# Patient Record
Sex: MALE | Race: WHITE | NOT HISPANIC OR LATINO | Employment: FULL TIME | URBAN - METROPOLITAN AREA
[De-identification: names, ages, dates, MRNs, and addresses within clinical notes are randomized per-mention and may not be internally consistent; named-entity substitution may affect disease eponyms.]

---

## 2018-08-20 ENCOUNTER — OFFICE VISIT (OUTPATIENT)
Dept: FAMILY MEDICINE | Facility: CLINIC | Age: 33
End: 2018-08-20
Payer: COMMERCIAL

## 2018-08-20 VITALS
HEIGHT: 70 IN | RESPIRATION RATE: 16 BRPM | OXYGEN SATURATION: 97 % | DIASTOLIC BLOOD PRESSURE: 68 MMHG | HEART RATE: 76 BPM | TEMPERATURE: 98.3 F | WEIGHT: 141.6 LBS | SYSTOLIC BLOOD PRESSURE: 128 MMHG | BODY MASS INDEX: 20.27 KG/M2

## 2018-08-20 DIAGNOSIS — H65.02 ACUTE SEROUS OTITIS MEDIA OF LEFT EAR, RECURRENCE NOT SPECIFIED: ICD-10-CM

## 2018-08-20 DIAGNOSIS — H61.23 BILATERAL IMPACTED CERUMEN: Primary | ICD-10-CM

## 2018-08-20 PROCEDURE — 69209 REMOVE IMPACTED EAR WAX UNI: CPT | Mod: 50 | Performed by: FAMILY MEDICINE

## 2018-08-20 PROCEDURE — 99202 OFFICE O/P NEW SF 15 MIN: CPT | Mod: 25 | Performed by: FAMILY MEDICINE

## 2018-08-20 RX ORDER — AZITHROMYCIN 250 MG/1
TABLET, FILM COATED ORAL
Qty: 6 TABLET | Refills: 0 | Status: SHIPPED | OUTPATIENT
Start: 2018-08-20

## 2018-08-20 NOTE — PROGRESS NOTES
"  SUBJECTIVE:   Matthew Guerrero is a 32 year old male who presents to clinic today for the following health issues:      Concern - Ear pain  Onset: x 1 month after having vertigo     Description:   Ringing in left ear, both ears will sometimes get pressure behind them    Intensity: 4-5/10    Progression of Symptoms:  same    Accompanying Signs & Symptoms:  Had Vertigo about a month ago    Previous history of similar problem:   Yes with past vertigo about a year ago    Precipitating factors:   Worsened by: nothing    Alleviating factors:  Improved by: nothing    Therapies Tried and outcome: Nothing has been tried        Problem list and histories reviewed & adjusted, as indicated.  Additional history: as documented        Reviewed and updated as needed this visit by clinical staff       Reviewed and updated as needed this visit by Provider        SUBJECTIVE:  Matthew  is a 32 year old male who presents for: Left ear pain and decreased hearing in both ears.  He has had episodes of vertigo.  [place] trouble.  He has had trouble with his ears after swimming.    History reviewed. No pertinent past medical history.  Past Surgical History:   Procedure Laterality Date     HC CLOSED TX DIST RAD/ULNAR STYL FX W/O MANIP  1997     Social History   Substance Use Topics     Smoking status: Current Every Day Smoker     Packs/day: 0.50     Types: Cigarettes     Smokeless tobacco: Never Used     Alcohol use Yes      Comment: occasionally     Current Outpatient Prescriptions   Medication Sig Dispense Refill     azithromycin (ZITHROMAX) 250 MG tablet Two tablets first day, then one tablet daily for four days. 6 tablet 0       REVIEW OF SYSTEMS:   5 point ROS negative except as noted above in HPI, including Gen., Resp, CV, GI &  system review.     OBJECTIVE:  Vitals: /68  Pulse 76  Temp 98.3  F (36.8  C) (Temporal)  Resp 16  Ht 5' 10.08\" (1.78 m)  Wt 141 lb 9.6 oz (64.2 kg)  SpO2 " 97%  BMI 20.27 kg/m2  BMI= Body mass index is 20.27 kg/(m^2).  He is alert appears in no distress.  Both canals are completely occluded with cerumen and were successfully irrigated out with elephant wash system.  Left TM looked red.  Right was okay.  Throat clear.  Neck supple no adenopathy.    ASSESSMENT:  Left otitis media  #2 bilateral cerumen impaction cleared    PLAN:  We will put him on a Z-Ady hopefully that will clear the infection on the left.  Follow-up if not improving we recheck that ear again.  Also recommended some Mucinex.  His hearing was much better when he left.        Tanner Ugarte MD  [facility]

## 2018-08-20 NOTE — MR AVS SNAPSHOT
"              After Visit Summary   2018    Matthew Guerrero    MRN: [id number]           Patient Information     Date Of Birth          1985        Visit Information        Provider Department      2018 1:40 PM Tanner Ugarte MD [facility]        Today's Diagnoses     Bilateral impacted cerumen    -  1    Acute serous otitis media of left ear, recurrence not specified           Follow-ups after your visit        Who to contact     If you have questions or need follow up information about today's clinic visit or your schedule please contact [facility] directly at [phone].  Normal or non-critical lab and imaging results will be communicated to you by Baydinhart, letter or phone within 4 business days after the clinic has received the results. If you do not hear from us within 7 days, please contact the clinic through Baydinhart or phone. If you have a critical or abnormal lab result, we will notify you by phone as soon as possible.  Submit refill requests through I.Systems or call your pharmacy and they will forward the refill request to us. Please allow 3 business days for your refill to be completed.          Additional Information About Your Visit        MyChart Information     [employer]Systems lets you send messages to your doctor, view your test results, renew your prescriptions, schedule appointments and more. To sign up, go to [email or web address] . Click on \"Log in\" on the left side of the screen, which will take you to the Welcome page. Then click on \"Sign up Now\" on the right side of the page.     You will be asked to enter the access code listed below, as well as some personal information. Please follow the directions to create your username and password.     Your access code is: [email or web address]  Expires: 2018  3:28 PM     Your access code will  in 90 days. If you need help or a new code, please call your [facility] or [phone].        Care " "EveryWhere ID     This is your Care EveryWhere ID. This could be used by other organizations to access your [place] medical records  [id number]        Your Vitals Were     Pulse Temperature Respirations Height Pulse Oximetry BMI (Body Mass Index)    76 98.3  F (36.8  C) (Temporal) 16 5' 10.08\" (1.78 m) 97% 20.27 kg/m2       Blood Pressure from Last 3 Encounters:   08/20/18 128/68   06/13/12 142/74   04/20/06 124/66    Weight from Last 3 Encounters:   08/20/18 141 lb 9.6 oz (64.2 kg)   06/13/12 151 lb 2 oz (68.5 kg)   04/20/06 155 lb (70.3 kg) (52 %)*     * Growth percentiles are based on [facility] 2-20 Years data.              We Performed the Following     HC REMOVAL IMPACTED CERUMEN IRRIGATION/LVG UNILAT          Today's Medication Changes          These changes are accurate as of 8/20/18  3:28 PM.  If you have any questions, ask your nurse or doctor.               Start taking these medicines.        Dose/Directions    azithromycin 250 MG tablet   Commonly known as:  ZITHROMAX   Used for:  Acute serous otitis media of left ear, recurrence not specified   Started by:  Tanner Ugarte MD        Two tablets first day, then one tablet daily for four days.   Quantity:  6 tablet   Refills:  0            Where to get your medicines      These medications were sent to [facility] [address]  [address] [facility] [address]     Phone:  [phone]     azithromycin 250 MG tablet                Primary Care Provider Fax #    Physician No Ref-Primary [phone]       No address on file        Equal Access to Services     [facility]: Hadii aad ku hadasho Soeris, waaxda luqadaha, qaybta kaalmada adeegyahui, edouard nieto. So [facility] [phone].    ATENCIÓN: Si habla español, tiene a dela cruz disposición servicios gratuitos de asistencia lingüística. Llame al [phone].    We comply with applicable federal civil rights laws and [place] laws. We do not " discriminate on the basis of race, color, national origin, age, disability, sex, sexual orientation, or gender identity.            Thank you!     Thank you for choosing [facility]  for your care. Our goal is always to provide you with excellent care. Hearing back from our patients is one way we can continue to improve our services. Please take a few minutes to complete the written survey that you may receive in the mail after your visit with us. Thank you!             Your Updated Medication List - Protect others around you: Learn how to safely use, store and throw away your medicines at www.disposemymeds.org.          This list is accurate as of 8/20/18  3:28 PM.  Always use your most recent med list.                   Brand Name Dispense Instructions for use Diagnosis    azithromycin 250 MG tablet    ZITHROMAX    6 tablet    Two tablets first day, then one tablet daily for four days.    Acute serous otitis media of left ear, recurrence not specified

## 2020-08-31 ENCOUNTER — HOSPITAL ENCOUNTER (EMERGENCY)
Facility: CLINIC | Age: 35
Discharge: HOME | End: 2020-08-31
Attending: EMERGENCY MEDICINE | Admitting: EMERGENCY MEDICINE
Payer: COMMERCIAL

## 2020-08-31 VITALS
DIASTOLIC BLOOD PRESSURE: 86 MMHG | TEMPERATURE: 97.8 F | SYSTOLIC BLOOD PRESSURE: 133 MMHG | OXYGEN SATURATION: 100 % | RESPIRATION RATE: 16 BRPM | HEART RATE: 68 BPM

## 2020-08-31 DIAGNOSIS — S91.311A LACERATION OF FOOT, RIGHT, INITIAL ENCOUNTER: ICD-10-CM

## 2020-08-31 PROCEDURE — 90715 TDAP VACCINE 7 YRS/> IM: CPT | Performed by: EMERGENCY MEDICINE

## 2020-08-31 PROCEDURE — 25000128 H RX IP 250 OP 636: Performed by: EMERGENCY MEDICINE

## 2020-08-31 PROCEDURE — 12002 RPR S/N/AX/GEN/TRNK2.6-7.5CM: CPT | Performed by: EMERGENCY MEDICINE

## 2020-08-31 PROCEDURE — 99282 EMERGENCY DEPT VISIT SF MDM: CPT | Mod: 25 | Performed by: EMERGENCY MEDICINE

## 2020-08-31 PROCEDURE — 99283 EMERGENCY DEPT VISIT LOW MDM: CPT | Mod: 25 | Performed by: EMERGENCY MEDICINE

## 2020-08-31 PROCEDURE — 12002 RPR S/N/AX/GEN/TRNK2.6-7.5CM: CPT | Mod: Z6 | Performed by: EMERGENCY MEDICINE

## 2020-08-31 PROCEDURE — 90471 IMMUNIZATION ADMIN: CPT | Performed by: EMERGENCY MEDICINE

## 2020-08-31 RX ADMIN — CLOSTRIDIUM TETANI TOXOID ANTIGEN (FORMALDEHYDE INACTIVATED), CORYNEBACTERIUM DIPHTHERIAE TOXOID ANTIGEN (FORMALDEHYDE INACTIVATED), BORDETELLA PERTUSSIS TOXOID ANTIGEN (GLUTARALDEHYDE INACTIVATED), BORDETELLA PERTUSSIS FILAMENTOUS HEMAGGLUTININ ANTIGEN (FORMALDEHYDE INACTIVATED), BORDETELLA PERTUSSIS PERTACTIN ANTIGEN, AND BORDETELLA PERTUSSIS FIMBRIAE 2/3 ANTIGEN 0.5 ML: 5; 2; 2.5; 5; 3; 5 INJECTION, SUSPENSION INTRAMUSCULAR at 17:50

## 2020-08-31 NOTE — ED AVS SNAPSHOT
[facility] Emergency Department  [address] [place] [address]  Phone:  [phone]  Fax:  [phone]                                    Matthew Guerrero   MRN: [id number]    Department:  [facility] Emergency Department   Date of Visit:  8/31/2020           After Visit Summary Signature Page    I have received my discharge instructions, and my questions have been answered. I have discussed any challenges I see with this plan with the nurse or doctor.    ..........................................................................................................................................  Patient/Patient Representative Signature      ..........................................................................................................................................  Patient Representative Print Name and Relationship to Patient    ..................................................               ................................................  Date                                   Time    ..........................................................................................................................................  Reviewed by Signature/Title    ...................................................              ..............................................  Date                                               Time          22EPIC Rev 08/18

## 2020-08-31 NOTE — ED TRIAGE NOTES
Laceration to right ankle from tin siding.Patient's airway, breathing, circulation, and disability/mental status (ABCDs) intact/WDL during triage.

## 2020-08-31 NOTE — ED PROVIDER NOTES
History     Chief Complaint   Patient presents with     Laceration     The history is provided by the patient.     Matthew Guerrero is a 34 year old male who presents to the ER secondary to a laceration sustained just prior to arrival.  He sustained the laceration after he was hauling sheet metal. He says him and a friend were carrying a piece and went to set it down when it cut him. He notes wearing crocs. The laceration is located on the right ankle.  Bleeding is controlled. There is not loss of range of motion, no numbness.  Prior to arrival he covered the wound. Tetanus vaccination is due. Pain is mild.     Allergies:  Allergies   Allergen Reactions     Penicillins        Problem List:    There are no active problems to display for this patient.       Past Medical History:    History reviewed. No pertinent past medical history.    Past Surgical History:    Past Surgical History:   Procedure Laterality Date     HC CLOSED TX DIST RAD/ULNAR STYL FX W/O MANIP  1997       Family History:    History reviewed. No pertinent family history.    Social History:  Marital Status:  Single [1]  Social History     Tobacco Use     Smoking status: Current Every Day Smoker     Packs/day: 0.75     Types: Cigarettes     Smokeless tobacco: Never Used   Substance Use Topics     Alcohol use: Yes     Comment: occasionally     Drug use: Yes     Types: Marijuana     Comment: yesterday        Medications:    azithromycin (ZITHROMAX) 250 MG tablet          Review of Systems   All other systems reviewed and are negative.      Physical Exam   BP: 133/86  Pulse: 68  Temp: 97.8  F (36.6  C)  Resp: 16  SpO2: 100 %      Physical Exam  Vitals signs and nursing note reviewed.   Constitutional:       General: He is not in acute distress.     Appearance: He is well-developed. He is not diaphoretic.   HENT:      Head: Normocephalic and atraumatic.      Nose: Nose normal.      Mouth/Throat:      Mouth: Mucous membranes are moist.   Eyes:       General: No scleral icterus.  Neck:      Musculoskeletal: Normal range of motion and neck supple.   Cardiovascular:      Rate and Rhythm: Normal rate and regular rhythm.   Skin:     General: Skin is warm and dry.      Findings: No rash.      Comments: 3 cm full thickness laceration to the dorsum of the right ankle/foot. No tendon involvement.    Neurological:      Mental Status: He is alert and oriented to person, place, and time.         ED Course        Procedures            [facility] Procedure Note        Laceration Repair:    Performed by: Jose Philip MD  Authorized by: Jose Philip MD  Consent given by: Patient who states understanding of the procedure being performed after discussing the risks, benefits and alternatives.    Preparation: Patient was prepped and draped in usual sterile fashion.  Irrigation solution: saline    Body area:right ankle  Laceration length: 3cm  Contamination: The wound is not contaminated.  Foreign bodies:none  Tendon involvement: none  Anesthesia: Local  Local anesthetic: Lidocaine     1%, with epinephrine  Anesthetic total: 5ml    Debridement: none  Skin closure: Closed with 8 x 4.0 Ethilon  Technique: interrupted  Approximation: close  Approximation difficulty: simple    Patient tolerance: Patient tolerated the procedure well with no immediate complications.           No results found for this or any previous visit (from the past 24 hour(s)).    Medications   Tdap (tetanus-diphtheria-acell pertussis) (ADACEL) injection 0.5 mL (0.5 mLs Intramuscular Given 8/31/20 1750)       Assessments & Plan (with Medical Decision Making)  34 year old male who presents with concerns of a laceration. Upon arrival in the Ed he is afebrile and his vitals are all within normal limits. When examined he was found to have a 3 cmm full thickness laceration to his right ankle. There is no indication of damage to the tendons or bone. An X-ray was not obtained due to the wound being  superficial. He has full ROM of the right ankle and toes. He received a Tetanus shot as well since his last one was over 10 years ago.   The laceration was repaired here in the ED. See the dictated procedure above for the full report. The patient should have his sutures removed in 7 days. He feels comfortable doing this at home, but if he decides he wants to be seen he can make an appointment in the clinic. He can take ibuprofen/tyelenol for his pain. I explained how to keep the wound clean and dry to allow the laceration to heal properly. The patient was in agreement with this treatment plan and is suitable for discharge.     I have reviewed the nursing notes.    I have reviewed the findings, diagnosis, plan and need for follow up with the patient.      Discharge Medication List as of 8/31/2020  6:16 PM          Final diagnoses:   Laceration of foot, right, initial encounter         This document serves as a record of services personally performed by No att. providers found. It was created on their behalf by Galilea Chino, a trained medical scribe. The creation of this record is based on the provider's personal observations and the statements of the patient. This document has been checked and approved by the attending provider.  Note: Chart documentation done in part with Dragon Voice Recognition software. Although reviewed after completion, some word and grammatical errors may remain.  8/31/2020   [facility] EMERGENCY DEPARTMENT     Jose Philip MD  08/31/20 1954

## 2020-08-31 NOTE — DISCHARGE INSTRUCTIONS
Keep wound clean and dry.  Apply antibiotic ointment and fresh bandage every other day. Follow up in the clinic in 7 for suture removal and wound check.  If you are comfortable having your girlfriend remove the sutures, that is perfectly acceptable. Please return to the ER if new or worsening symptoms for re-evaluation. I hope you get better quickly.  Hope your business rebounds soon.